# Patient Record
Sex: MALE | Race: OTHER
[De-identification: names, ages, dates, MRNs, and addresses within clinical notes are randomized per-mention and may not be internally consistent; named-entity substitution may affect disease eponyms.]

---

## 2018-09-04 ENCOUNTER — HOSPITAL ENCOUNTER (INPATIENT)
Dept: HOSPITAL 74 - YASAS | Age: 28
LOS: 4 days | Discharge: HOME | End: 2018-09-08
Attending: INTERNAL MEDICINE | Admitting: INTERNAL MEDICINE
Payer: COMMERCIAL

## 2018-09-04 VITALS — BODY MASS INDEX: 23.2 KG/M2

## 2018-09-04 DIAGNOSIS — F10.230: Primary | ICD-10-CM

## 2018-09-04 DIAGNOSIS — F12.20: ICD-10-CM

## 2018-09-04 DIAGNOSIS — R76.11: ICD-10-CM

## 2018-09-04 DIAGNOSIS — F17.213: ICD-10-CM

## 2018-09-04 DIAGNOSIS — F19.282: ICD-10-CM

## 2018-09-04 DIAGNOSIS — Z91.89: ICD-10-CM

## 2018-09-04 DIAGNOSIS — Z88.0: ICD-10-CM

## 2018-09-04 DIAGNOSIS — F10.24: ICD-10-CM

## 2018-09-04 DIAGNOSIS — F32.9: ICD-10-CM

## 2018-09-05 PROCEDURE — HZ2ZZZZ DETOXIFICATION SERVICES FOR SUBSTANCE ABUSE TREATMENT: ICD-10-PCS | Performed by: INTERNAL MEDICINE

## 2018-09-05 RX ADMIN — NICOTINE SCH: 21 PATCH TRANSDERMAL at 10:22

## 2018-09-05 RX ADMIN — Medication SCH MG: at 22:44

## 2018-09-05 RX ADMIN — Medication SCH TAB: at 10:21

## 2018-09-05 RX ADMIN — HYDROXYZINE PAMOATE PRN MG: 50 CAPSULE ORAL at 02:41

## 2018-09-05 NOTE — PN
S CIWA





- CIWA Score


Nausea/Vomitin-No Nausea/No Vomiting


Muscle Tremors: 4-Moderate,w/Arms Extend


Anxiety: 4-Mod. Anxious/Guarded


Agitation: 4-Moderately Restless


Paroxysmal Sweats: 1-Minimal Palms Moist


Orientation: 0-Oriented


Tacttile Disturbances: 0-None


Auditory Disturbances: 0-None


Visual Disturbances: 0-None


Headache: 0-None Present


CIWA-Ar Total Score: 13





BHS Progress Note (SOAP)


Subjective: 





ANXIETY,SWEATS,FATIGUE.


Objective: 





18 13:25


 Vital Signs











  18





  06:26 09:12


 


Temperature 97.0 F L 97.5 F L


 


Pulse Rate 76 61


 


Respiratory 18 18





Rate  


 


Blood Pressure 110/61 125/65








LABS PENDING


Assessment: 





18 13:25


WITHDRAWAL SX


Plan: 





CONTINUE DETOX

## 2018-09-05 NOTE — HP
CIWA Score





- CIWA Score


Nausea/Vomiting: 3


Muscle Tremors: 1-None Visible, but Felt


Anxiety: 5


Agitation: 5


Paroxysmal Sweats: 3


Orientation: 0-Oriented


Tacttile Disturbances: 0-None


Auditory Disturbances: 0-None


Visual Disturbances: 2-Mild Sensitivity


Headache: 0-None Present


CIWA-Ar Total Score: 19





Admission ROS S





- HPI


Chief Complaint: 





C/O WITHDRAWAL SX'S. SEEKING DETOX FROM ALCOHOL


Allergies/Adverse Reactions: 


 Allergies











Allergy/AdvReac Type Severity Reaction Status Date / Time


 


Penicillins Allergy Intermediate Hives Verified 09/04/18 23:45











History of Present Illness: 





28 Y.O. MALE WITH ALCOHOLISM HERE FOR DETOX. CLIENT LAST HERE FROM 3/15/2016-3/

18/16. HE DENIES DETOX SINCE. SELF REFERRED AFTER HIS FAMILY KICKED HIM OUT 

UNTIL HE SEEK TXMENT. REPORTS LONGEST CLEAN TIME 1 YEARS. RELPASING 1 YEAR AGO.

  DENIES SEIZURE D/O C.P, AVH, PAST/PRESENT SI/HI. REPORTS HX/O BLACK OUTS AND 

SOB DUE TO NICOTINE DEP.








PMHX: DENIES


PSYCH: DEPRESSION


Exam Limitations: No Limitations





- Ebola screening


Have you traveled outside of the country in the last 21 days: No


Have you had contact with anyone from an Ebola affected area: No


Have you been sick,other than usual withdrawal symptoms: No


Do you have a fever: No





- Review of Systems


Constitutional: Loss of Appetite, Malaise, Changes in sleep, Unintentional Wgt. 

Loss


EENT: reports: No Symptoms Reported


Respiratory: reports: No Symptoms reported


Cardiac: reports: No Symptoms Reported


GI: reports: Poor Appetite, Poor Fluid Intake, Abdominal cramping


: reports: No Symptoms Reported


Musculoskeletal: reports: Back Pain


Integumentary: reports: Rash ( R LEG X 2WEEKS)


Neuro: reports: No Symptoms reported


Endocrine: reports: No Symptoms Reported


Hematology: reports: No Symptoms Reported


Psychiatric: reports: Agitated, Depressed


Other Systems: Reviewed and Negative





Patient History





- Patient Medical History


Hx Anemia: No


Hx Asthma: No


Hx Chronic Obstructive Pulmonary Disease (COPD): No


Hx Cancer: No


Hx Cardiac Disorders: No


Hx Congestive Heart Failure: No


Hx Hypertension: No


Hx Hypercholesterolemia: No


Hx Pacemaker: No


HX Cerebrovascular Accident: No


Hx Seizures: No


Hx Dementia: No


Hx Diabetes: No


Hx Gastrointestinal Disorders: No


Hx Liver Disease: No


Hx Genitourinary Disorders: No


Hx Sexually Transmitted Disorders: No


Hx Renal Disease (ESRD): No


Hx Thyroid Disease: No


Hx Human Immunodeficiency Virus (HIV): No


Hx Hepatitis C: No


Hx Depression: Yes


Hx Suicide Attempt: No


Hx Bipolar Disorder: No


Hx Schizophrenia: No





- Patient Surgical History


Past Surgical History: No


Hx Neurologic Surgery: No


Hx Cataract Extraction: No


Hx Cardiac Surgery: No


Hx Lung Surgery: No


Hx Breast Surgery: No


Hx Breast Biopsy: No


Hx Abdominal Surgery: No


Hx Appendectomy: No


Hx Cholecystectomy: No


Hx Genitourinary Surgery: No


Hx Orthopedic Surgery: No


Anesthesia Reaction: No





- PPD History


Previous Implant?: Yes


Documented Results: Positive w/o proof


PPD to be Administered?: No





- Smoking Cessation


Smoking history: Current every day smoker


Have you smoked in the past 12 months: Yes


Aproximately how many cigarettes per day: 20


Cigars Per Day: 0


Hx Chewing Tobacco Use: No


Initiated information on smoking cessation: Yes


'Breaking Loose' booklet given: 09/05/18





- Substance & Tx. History


Hx Alcohol Use: Yes


Hx Substance Use: Yes


Substance Use Type: Alcohol, Marijuana


Hx Substance Use Treatment: Yes ([facility])





- Substances Abused


  ** Alcohol


Route: Oral


Frequency: Daily


Amount used: Beer 40 oz, Vodka 1/2 pint


Age of first use: 16


Date of Last Use: 09/04/18





  ** Marijuana/Hashish


Route: Smoking


Frequency: Daily


Amount used: $20


Age of first use: 16


Date of Last Use: 09/04/18





Family Disease History





- Family Disease History


Family History: Denies





Admission Physical Exam [facility]





- Vital Signs


Vital Signs: 


 Vital Signs - 24 hr











  09/04/18





  20:13


 


Temperature 99.2 F


 


Pulse Rate 75


 


Respiratory 20





Rate 


 


Blood Pressure 140/83














- Physical


General Appearance: Yes: Appropriately Dressed, Mild Distress, Thin, Irritable, 

Sweating, Other (MALODUROUS)


HEENTM: Yes: EOMI, Normocephalic, Normal Voice, REJI, Pharynx Normal


Respiratory: Yes: Chest Non-Tender, Lungs Clear, Normal Breath Sounds, No 

Respiratory Distress, No Accessory Muscle Use


Neck: Yes: No masses,lesions,Nodules, Supple, Trachea in good position


Breast: Yes: Breast Exam Deferred


Cardiology: Yes: Regular Rhythm, Regular Rate, S1, S2


Abdominal: Yes: Normal Bowel Sounds, Non Tender, Soft


Genitourinary: Yes: Other (NO C/O)


Back: Yes: Normal Inspection


Musculoskeletal: Yes: full range of Motion, Gait Steady, Back pain (C/O)


Extremities: Yes: Normal Capillary Refill, Normal Range of Motion, Non-Tender, 

Tremors (SLIGHTLY FELT)


Neurological: Yes: CNs II-XII NML intact, Fully Oriented, Alert, Depressed 

Affect


Integumentary: Yes: Dry, Warm, Rash (RIGHT POSTERIOT THIGH DERMATITS)


Lymphatic: Yes: Within Normal Limits





- Diagnostic


(1) History of positive PPD


Current Visit: Yes   Status: Chronic   





(2) At risk for dehydration due to poor fluid intake


Current Visit: Yes   Status: Acute   





(3) Substance-induced sleep disorder


Current Visit: Yes   Status: Chronic   





(4) Alcohol-induced mood disorder


Current Visit: Yes   Status: Chronic   





(5) Alcohol dependence with uncomplicated withdrawal


Current Visit: Yes   Status: Acute   





(6) Cannabis dependence


Current Visit: Yes   Status: Chronic   





(7) Depression


Current Visit: Yes   Status: Chronic   


Qualifiers: 


   Major depression episode severity: unspecified 





(8) Nicotine dependence


Current Visit: Yes   Status: Chronic   


Qualifiers: 


   Nicotine product type: cigarettes   Substance use status: uncomplicated   

Qualified Code(s): F17.210 - Nicotine dependence, cigarettes, uncomplicated   





Cleared for Admission [facility]





- Detox or Rehab


[facility] Level of Care: Medically Managed


Detox Regimen/Protocol: Librium


Claeared for Rehab Admission: No





S Breath Alcohol Content


Breath Alcohol Content: 0.100





Urine Drug Screen





- Results


Drug Screen Negative: No


Urine Drug Screen Results: THC-Marijuana

## 2018-09-05 NOTE — CONSULT
[facility] Psychiatric Consult





- Data


Date of interview: 09/05/18


Admission source: [facility]


Identifying data: Readmission to [facility] for this 29 y/o  male from 

[place] ancestry, self-referred for detoxification treatment [address] (

alcohol + cannabis dependence).additional self-report of occasional use of 

ecstasy and cocaine.Patient is single without dependents,homeless,unemployed 

and supported on food stamps.


Substance Abuse History: Confirmed by the patient in this interview.Details in 

current BHS report : Smoking history: Current every day smoker.  Have you 

smoked in the past 12 months: Yes.  Aproximately how many cigarettes per day: 

20.  Cigars Per Day: 0.  Hx Chewing Tobacco Use: No.  Initiated information on 

smoking cessation: Yes.  'Breaking Loose' booklet given: 09/05/18.  - Substance 

& Tx. History.  Hx Alcohol Use: Yes.  Hx Substance Use: Yes.  Substance Use Type

: Alcohol, Marijuana.  Hx Substance Use Treatment: Yes ([facility]).  - Substances 

Abused.  ** Alcohol.  Route: Oral.  Frequency: Daily.  Amount used: Beer 40 oz, 

Vodka 1/2 pint.  Age of first use: 16.  Date of Last Use: 09/04/18.  ** 

Marijuana/Hashish.  Route: Smoking.  Frequency: Daily.  Amount used: $20.  Age 

of first use: 16.  Date of Last Use: 09/04/18


Medical History: Patient endorses good general health.


Psychiatric History: Patient denies history of psychiatric 

hospitalizations.Brief treatment with remeron in 2015 to address depression and 

insomnia.Duration : less than six months.No OPD care since 2015.Mr Crowder 

denies history of suicide attempts.


Physical/Sexual Abuse/Trauma History: Patient denies.


Additional Comment: Urine Drug Screen Results: THC-Marijuana.Noted.





Mental Status Exam





- Mental Status Exam


Alert and Oriented to: Time, Place, Person


Cognitive Function: Good


Patient Appearance: Well Groomed


Mood: Hopeful, Euthymic


Affect: Appropriate, Normal Range


Patient Behavior: Fatigued, Appropriate, Cooperative


Speech Pattern: Clear


Voice Loudness: Normal


Thought Process: Intact, Goal Oriented


Thought Disorder: Not Present


Hallucinations: Denies


Suicidal Ideation: Denies


Homicidal Ideation: Denies


Insight/Judgement: Poor


Sleep: Well


Appetite: Good


Muscle strength/Tone: Normal


Gait/Station: Normal





Psychiatric Findings





- Problem List (Axis 1, 2,3)


(1) Alcohol dependence with uncomplicated withdrawal


Current Visit: Yes   Status: Acute   





(2) Cannabis dependence


Current Visit: Yes   Status: Acute   





(3) Nicotine dependence


Current Visit: Yes   Status: Chronic   


Qualifiers: 


   Nicotine product type: cigarettes   Substance use status: uncomplicated   

Qualified Code(s): F17.210 - Nicotine dependence, cigarettes, uncomplicated   





- Initial Treatment Plan


Initial Treatment Plan: Psychoeducation.Sleep 

hygiene.Detoxification.Observation.

## 2018-09-06 LAB
ALBUMIN SERPL-MCNC: 3.2 G/DL (ref 3.4–5)
ALP SERPL-CCNC: 94 U/L (ref 45–117)
ALT SERPL-CCNC: 22 U/L (ref 12–78)
ANION GAP SERPL CALC-SCNC: 7 MMOL/L (ref 8–16)
AST SERPL-CCNC: 19 U/L (ref 15–37)
BILIRUB SERPL-MCNC: 0.7 MG/DL (ref 0.2–1)
BUN SERPL-MCNC: 9 MG/DL (ref 7–18)
CALCIUM SERPL-MCNC: 8.9 MG/DL (ref 8.5–10.1)
CHLORIDE SERPL-SCNC: 104 MMOL/L (ref 98–107)
CO2 SERPL-SCNC: 30 MMOL/L (ref 21–32)
CREAT SERPL-MCNC: 0.8 MG/DL (ref 0.7–1.3)
DEPRECATED RDW RBC AUTO: 13.8 % (ref 11.9–15.9)
GLUCOSE SERPL-MCNC: 85 MG/DL (ref 74–106)
HCT VFR BLD CALC: 44.9 % (ref 35.4–49)
HGB BLD-MCNC: 15 GM/DL (ref 11.7–16.9)
MCH RBC QN AUTO: 31.6 PG (ref 25.7–33.7)
MCHC RBC AUTO-ENTMCNC: 33.4 G/DL (ref 32–35.9)
MCV RBC: 94.8 FL (ref 80–96)
PLATELET # BLD AUTO: 244 K/MM3 (ref 134–434)
PMV BLD: 8.7 FL (ref 7.5–11.1)
POTASSIUM SERPLBLD-SCNC: 4.2 MMOL/L (ref 3.5–5.1)
PROT SERPL-MCNC: 6.2 G/DL (ref 6.4–8.2)
RBC # BLD AUTO: 4.74 M/MM3 (ref 4–5.6)
SODIUM SERPL-SCNC: 141 MMOL/L (ref 136–145)
WBC # BLD AUTO: 3.9 K/MM3 (ref 4–10)

## 2018-09-06 RX ADMIN — Medication SCH MG: at 22:33

## 2018-09-06 RX ADMIN — NICOTINE SCH: 21 PATCH TRANSDERMAL at 10:50

## 2018-09-06 RX ADMIN — Medication SCH: at 10:50

## 2018-09-06 RX ADMIN — Medication PRN MG: at 22:33

## 2018-09-06 NOTE — PN
S CIWA





- CIWA Score


Nausea/Vomitin-No Nausea/No Vomiting


Muscle Tremors: 4-Moderate,w/Arms Extend


Anxiety: 4-Mod. Anxious/Guarded


Agitation: 4-Moderately Restless


Paroxysmal Sweats: 1-Minimal Palms Moist


Orientation: 0-Oriented


Tacttile Disturbances: 0-None


Auditory Disturbances: 0-None


Visual Disturbances: 0-None


Headache: 0-None Present


CIWA-Ar Total Score: 13





BHS Progress Note (SOAP)


Subjective: 





ANXIETY,SWEATS,FATIGUE.


Objective: 





18 14:15


 Vital Signs











  18





  06:30 10:46


 


Temperature 96.9 F L 98.0 F


 


Pulse Rate 48 L 68


 


Respiratory 18 18





Rate  


 


Blood Pressure 125/74 119/64








 Laboratory Tests











  18





  06:00 06:00


 


WBC  3.9 L 


 


RBC  4.74 


 


Hgb  15.0 


 


Hct  44.9 


 


MCV  94.8 


 


MCH  31.6 


 


MCHC  33.4 


 


RDW  13.8 


 


Plt Count  244 


 


MPV  8.7 


 


Sodium   141


 


Potassium   4.2


 


Chloride   104


 


Carbon Dioxide   30  D


 


Anion Gap   7 L


 


BUN   9


 


Creatinine   0.8


 


Creat Clearance w eGFR   > 60


 


Random Glucose   85


 


Calcium   8.9


 


Total Bilirubin   0.7


 


AST   19  D


 


ALT   22


 


Alkaline Phosphatase   94


 


Total Protein   6.2 L


 


Albumin   3.2 L











Assessment: 





18 14:16


WITHDRAWAL SX


Plan: 





CONTINUE DETOX

## 2018-09-07 VITALS — DIASTOLIC BLOOD PRESSURE: 94 MMHG | TEMPERATURE: 98 F | HEART RATE: 72 BPM | SYSTOLIC BLOOD PRESSURE: 150 MMHG

## 2018-09-07 LAB
APPEARANCE UR: CLEAR
BILIRUB UR STRIP.AUTO-MCNC: NEGATIVE MG/DL
COLOR UR: (no result)
KETONES UR QL STRIP: NEGATIVE
LEUKOCYTE ESTERASE UR QL STRIP.AUTO: NEGATIVE
NITRITE UR QL STRIP: NEGATIVE
PH UR: 7 [PH] (ref 5–8)
PROT UR QL STRIP: NEGATIVE
PROT UR QL STRIP: NEGATIVE
SP GR UR: 1.01 (ref 1–1.03)
UROBILINOGEN UR STRIP-MCNC: NEGATIVE MG/DL (ref 0.2–1)

## 2018-09-07 RX ADMIN — NICOTINE SCH: 21 PATCH TRANSDERMAL at 10:31

## 2018-09-07 RX ADMIN — Medication PRN MG: at 22:42

## 2018-09-07 RX ADMIN — Medication SCH MG: at 22:41

## 2018-09-07 RX ADMIN — HYDROXYZINE PAMOATE PRN MG: 50 CAPSULE ORAL at 00:25

## 2018-09-07 RX ADMIN — Medication SCH TAB: at 10:30

## 2018-09-07 NOTE — PN
BHS Progress Note (SOAP)


Subjective: 





ANXIETY,SWEATS, IRRITABILITY,RESTLESSNESS,AGITATION,ANGRY BUT REPORTS 

DROWSINESS FROM LIBRIUM. PT STATING I"M DEPRESSED, I AM TIRED OF BEING HERE 

DOING NOTHING. " ASKED PT WHO REFERRED HIM TO TREATMENT IF HE DID NOT WANT TO 

BE HERE. PT STATES  IT'S MY '. PT WAS ENCOURAGED TO DO A F/U WITH 

THE PSYCH MD AND THE COUNSELOR  BOONE KRAUSE TODAY TO START PLANNING FOR 

AFTERCARE UPON DISCHARGE. PT AGREED.


Objective: 





09/07/18 15:42


 Vital Signs











  09/07/18 09/07/18





  09:18 13:58


 


Temperature 96 F L 96.7 F L


 


Pulse Rate 68 59 L


 


Respiratory 20 18





Rate  


 


Blood Pressure 123/73 100/53








 Laboratory Tests











  09/06/18 09/06/18 09/06/18





  06:00 06:00 06:00


 


WBC  3.9 L  


 


RBC  4.74  


 


Hgb  15.0  


 


Hct  44.9  


 


MCV  94.8  


 


MCH  31.6  


 


MCHC  33.4  


 


RDW  13.8  


 


Plt Count  244  


 


MPV  8.7  


 


Sodium   141 


 


Potassium   4.2 


 


Chloride   104 


 


Carbon Dioxide   30  D 


 


Anion Gap   7 L 


 


BUN   9 


 


Creatinine   0.8 


 


Creat Clearance w eGFR   > 60 


 


Random Glucose   85 


 


Calcium   8.9 


 


Total Bilirubin   0.7 


 


AST   19  D 


 


ALT   22 


 


Alkaline Phosphatase   94 


 


Total Protein   6.2 L 


 


Albumin   3.2 L 


 


Urine Color   


 


Urine Appearance   


 


Urine pH   


 


Ur Specific Gravity   


 


Urine Protein   


 


Urine Glucose (UA)   


 


Urine Ketones   


 


Urine Blood   


 


Urine Nitrite   


 


Urine Bilirubin   


 


Urine Urobilinogen   


 


Ur Leukocyte Esterase   


 


RPR Titer    Nonreactive














  09/07/18





  09:50


 


WBC 


 


RBC 


 


Hgb 


 


Hct 


 


MCV 


 


MCH 


 


MCHC 


 


RDW 


 


Plt Count 


 


MPV 


 


Sodium 


 


Potassium 


 


Chloride 


 


Carbon Dioxide 


 


Anion Gap 


 


BUN 


 


Creatinine 


 


Creat Clearance w eGFR 


 


Random Glucose 


 


Calcium 


 


Total Bilirubin 


 


AST 


 


ALT 


 


Alkaline Phosphatase 


 


Total Protein 


 


Albumin 


 


Urine Color  Ltyellow


 


Urine Appearance  Clear


 


Urine pH  7.0


 


Ur Specific Gravity  1.012


 


Urine Protein  Negative


 


Urine Glucose (UA)  Negative


 


Urine Ketones  Negative


 


Urine Blood  Negative


 


Urine Nitrite  Negative


 


Urine Bilirubin  Negative


 


Urine Urobilinogen  Negative


 


Ur Leukocyte Esterase  Negative


 


RPR Titer 











Assessment: 





09/07/18 15:42


WITHDRAWAL SX


Plan: 





CONTINUE DETOX


PT  REFERRED TO [facility].

## 2018-09-07 NOTE — PN
BHS Progress Note


Note: 





Psychiatry


Attending's note :


Request for re-consult.Answered.


Patient approached at bedside.


Mr Crowder is found asleep.


Resting comfortably.


Consult deferred.

## 2018-09-08 NOTE — DS
BHS Detox Discharge Summary


Admission Date: 


09/05/18





Discharge Date: 09/08/18





- History


Present History: Alcohol Dependence


Additional Comments: 





PT COMPLETED DETOX.


Pertinent Past History: 





SEE  DX BELOW





- Physical Exam Results


Vital Signs: 


 Vital Signs











Temperature  98.0 F   09/07/18 22:00


 


Pulse Rate  72   09/07/18 22:00


 


Respiratory Rate  18   09/08/18 03:30


 


Blood Pressure  150/94   09/07/18 22:00


 


O2 Sat by Pulse Oximetry (%)      











Pertinent Admission Physical Exam Findings: 





WITHDRAWAL SX


 Laboratory Tests











  09/06/18 09/06/18 09/06/18





  06:00 06:00 06:00


 


WBC  3.9 L  


 


RBC  4.74  


 


Hgb  15.0  


 


Hct  44.9  


 


MCV  94.8  


 


MCH  31.6  


 


MCHC  33.4  


 


RDW  13.8  


 


Plt Count  244  


 


MPV  8.7  


 


Sodium   141 


 


Potassium   4.2 


 


Chloride   104 


 


Carbon Dioxide   30  D 


 


Anion Gap   7 L 


 


BUN   9 


 


Creatinine   0.8 


 


Creat Clearance w eGFR   > 60 


 


Random Glucose   85 


 


Calcium   8.9 


 


Total Bilirubin   0.7 


 


AST   19  D 


 


ALT   22 


 


Alkaline Phosphatase   94 


 


Total Protein   6.2 L 


 


Albumin   3.2 L 


 


Urine Color   


 


Urine Appearance   


 


Urine pH   


 


Ur Specific Gravity   


 


Urine Protein   


 


Urine Glucose (UA)   


 


Urine Ketones   


 


Urine Blood   


 


Urine Nitrite   


 


Urine Bilirubin   


 


Urine Urobilinogen   


 


Ur Leukocyte Esterase   


 


RPR Titer    Nonreactive














  09/07/18





  09:50


 


WBC 


 


RBC 


 


Hgb 


 


Hct 


 


MCV 


 


MCH 


 


MCHC 


 


RDW 


 


Plt Count 


 


MPV 


 


Sodium 


 


Potassium 


 


Chloride 


 


Carbon Dioxide 


 


Anion Gap 


 


BUN 


 


Creatinine 


 


Creat Clearance w eGFR 


 


Random Glucose 


 


Calcium 


 


Total Bilirubin 


 


AST 


 


ALT 


 


Alkaline Phosphatase 


 


Total Protein 


 


Albumin 


 


Urine Color  Ltyellow


 


Urine Appearance  Clear


 


Urine pH  7.0


 


Ur Specific Gravity  1.012


 


Urine Protein  Negative


 


Urine Glucose (UA)  Negative


 


Urine Ketones  Negative


 


Urine Blood  Negative


 


Urine Nitrite  Negative


 


Urine Bilirubin  Negative


 


Urine Urobilinogen  Negative


 


Ur Leukocyte Esterase  Negative


 


RPR Titer 














- Treatment


Hospital Course: Detox Protocol Followed, Detoxed Safely, Responded well, 

Discharged Condition Good, Rehab Referral Accepted





- Medication


Discharge Medications: 


Ambulatory Orders





NK [No Known Home Medication]  03/15/16 











- Diagnosis


(1) Alcohol dependence with uncomplicated withdrawal


Status: Acute   





(2) At risk for dehydration due to poor fluid intake


Status: Acute   





(3) Nicotine dependence


Status: Acute   


Qualifiers: 


   Nicotine product type: cigarettes   Substance use status: in withdrawal   

Qualified Code(s): F17.213 - Nicotine dependence, cigarettes, with withdrawal   





(4) Cannabis dependence


Status: Acute   





- AMA


Did Patient Leave Against Medical Advice: No

## 2023-08-06 NOTE — EKG
Test Reason : 

Blood Pressure : ***/*** mmHG

Vent. Rate : 053 BPM     Atrial Rate : 053 BPM

   P-R Int : 152 ms          QRS Dur : 102 ms

    QT Int : 432 ms       P-R-T Axes : 050 058 037 degrees

   QTc Int : 405 ms

 

SINUS BRADYCARDIA WITH SINUS ARRHYTHMIA

VOLTAGE CRITERIA FOR LEFT VENTRICULAR HYPERTROPHY

ABNORMAL ECG

WHEN COMPARED WITH ECG OF 05-SEP-2018 02:12,

NO SIGNIFICANT CHANGE WAS FOUND

Confirmed by MICHAEL MCINTYRE, JOSEFINA ([id number]) on 9/6/2018 1:48:06 PM

 

Referred By:             Confirmed By:JOSEFINA RODRIGUEZ MD
Test Reason : 

Blood Pressure : ***/*** mmHG

Vent. Rate : 073 BPM     Atrial Rate : 073 BPM

   P-R Int : 136 ms          QRS Dur : 104 ms

    QT Int : 404 ms       P-R-T Axes : 040 062 038 degrees

   QTc Int : 445 ms

 

NORMAL SINUS RHYTHM

RSR' OR QR PATTERN IN V1 SUGGESTS RIGHT VENTRICULAR CONDUCTION DELAY

VOLTAGE CRITERIA FOR LEFT VENTRICULAR HYPERTROPHY

ABNORMAL ECG

NO PREVIOUS ECGS AVAILABLE

Confirmed by JASWANT MCINTYRE, MALACHI ([id number]) on 9/5/2018 12:32:36 PM

 

Referred By:             Confirmed By:MALACHI MICHAUD MD
punched